# Patient Record
Sex: MALE | Race: WHITE | NOT HISPANIC OR LATINO | URBAN - METROPOLITAN AREA
[De-identification: names, ages, dates, MRNs, and addresses within clinical notes are randomized per-mention and may not be internally consistent; named-entity substitution may affect disease eponyms.]

---

## 2022-03-03 ENCOUNTER — WEB ENCOUNTER (OUTPATIENT)
Dept: URBAN - METROPOLITAN AREA CLINIC 94 | Facility: CLINIC | Age: 42
End: 2022-03-03

## 2022-03-03 ENCOUNTER — OFFICE VISIT (OUTPATIENT)
Dept: URBAN - METROPOLITAN AREA CLINIC 94 | Facility: CLINIC | Age: 42
End: 2022-03-03
Payer: COMMERCIAL

## 2022-03-03 VITALS
TEMPERATURE: 97.2 F | HEART RATE: 94 BPM | HEIGHT: 65 IN | DIASTOLIC BLOOD PRESSURE: 101 MMHG | WEIGHT: 141 LBS | SYSTOLIC BLOOD PRESSURE: 149 MMHG | BODY MASS INDEX: 23.49 KG/M2

## 2022-03-03 DIAGNOSIS — R13.12 OROPHARYNGEAL DYSPHAGIA: ICD-10-CM

## 2022-03-03 DIAGNOSIS — K76.0 FATTY LIVER: ICD-10-CM

## 2022-03-03 DIAGNOSIS — R10.12 LUQ ABDOMINAL PAIN: ICD-10-CM

## 2022-03-03 DIAGNOSIS — R79.89 LFT ELEVATION: ICD-10-CM

## 2022-03-03 PROBLEM — 197321007: Status: ACTIVE | Noted: 2022-03-03

## 2022-03-03 PROCEDURE — 99204 OFFICE O/P NEW MOD 45 MIN: CPT | Performed by: PHYSICIAN ASSISTANT

## 2022-03-03 RX ORDER — ALBUTEROL SULFATE 90 UG/1
1 PUFF AS NEEDED AEROSOL, METERED RESPIRATORY (INHALATION)
Status: ON HOLD | COMMUNITY

## 2022-03-03 NOTE — HPI-TODAY'S VISIT:
40 yo M evaluated today for hx of dysphagia.   Pt reports a 3 weeks hx of dysphagia. Pt reports eating soft food, bread or rice without problems. He reports that with eating meat, his throat will close and he cannot eat anything else. This happens with meat or red sauce. He denies a hx of asthma per pt but he uses Albuterol prn. Pt has had similar sx for at least 1 yr. Pt reports sx started after having CCY in 2018. He denies acid reflux, heartburn or N/V. Pt denies ever having an EGD.   He reports extreme diarrhea over the past 3 weeks. He has 3-4 watery, non bloody stools/day which he feels is related to above issues.   Pt had multiple syncopal episodes x 5 in one day and then one more the following day. He was evaluated at [facility] and labs revealed ALT 71 otherwise no abnormalities with CMP, CBC, Thyroid panel. Pt denies ETOH use or changes in medication.

## 2022-03-05 ENCOUNTER — WEB ENCOUNTER (OUTPATIENT)
Dept: URBAN - METROPOLITAN AREA CLINIC 94 | Facility: CLINIC | Age: 42
End: 2022-03-05

## 2022-03-11 LAB
AAT, DNA ANALYSIS: (no result)
ACTIN (SMOOTH MUSCLE) ANTIBODY: 7
ADDITIONAL INFORMATION:: (no result)
ALBUMIN: 5.4
ALKALINE PHOSPHATASE: 119
ALPHA 2-MACROGLOBULINS, QN: 139
ALT (SGPT) P5P: 111
ALT (SGPT): 102
ANA DIRECT: NEGATIVE
APOLIPOPROTEIN A-1: 109
AST (SGOT) P5P: 48
AST (SGOT): 38
BILIRUBIN, DIRECT: <0.1
BILIRUBIN, TOTAL: 0.2
BILIRUBIN, TOTAL: 0.3
CHOLESTEROL, TOTAL: 192
COMMENT:: (no result)
FIBROSIS SCORE: 0.07
FIBROSIS SCORING:: (no result)
FIBROSIS STAGE: (no result)
GGT: 56
GLUCOSE, SERUM: 75
HAPTOGLOBIN: 255
HBSAG SCREEN: NEGATIVE
HCV AB: <0.1
HEIGHT:: 65
HEP A AB, IGM: NEGATIVE
HEP B CORE AB, IGM: NEGATIVE
INTERPRETATION:: (no result)
INTERPRETATIONS:: (no result)
IRON BIND.CAP.(TIBC): 320
IRON SATURATION: 34
IRON: 108
LIMITATIONS:: (no result)
Lab: (no result)
MITOCHONDRIAL (M2) ANTIBODY: <20
NASH GRADE: (no result)
NASH SCORE: 0.5
NASH SCORING: (no result)
PROTEIN, TOTAL: 8
STEATOSIS GRADE: (no result)
STEATOSIS GRADING: (no result)
STEATOSIS SCORE: 0.73
TRIGLYCERIDES: 322
UIBC: 212
WEIGHT:: 141

## 2022-03-13 ENCOUNTER — TELEPHONE ENCOUNTER (OUTPATIENT)
Dept: URBAN - METROPOLITAN AREA CLINIC 94 | Facility: CLINIC | Age: 42
End: 2022-03-13

## 2022-03-15 ENCOUNTER — WEB ENCOUNTER (OUTPATIENT)
Dept: URBAN - METROPOLITAN AREA CLINIC 94 | Facility: CLINIC | Age: 42
End: 2022-03-15

## 2022-04-07 ENCOUNTER — OFFICE VISIT (OUTPATIENT)
Dept: URBAN - METROPOLITAN AREA SURGERY CENTER 17 | Facility: SURGERY CENTER | Age: 42
End: 2022-04-07
Payer: COMMERCIAL

## 2022-04-07 ENCOUNTER — CLAIMS CREATED FROM THE CLAIM WINDOW (OUTPATIENT)
Dept: URBAN - METROPOLITAN AREA CLINIC 4 | Facility: CLINIC | Age: 42
End: 2022-04-07
Payer: COMMERCIAL

## 2022-04-07 DIAGNOSIS — K21.9 GASTRO-ESOPHAGEAL REFLUX DISEASE WITHOUT ESOPHAGITIS: ICD-10-CM

## 2022-04-07 DIAGNOSIS — K31.89 FOCAL FOVEOLAR HYPERPLASIA: ICD-10-CM

## 2022-04-07 DIAGNOSIS — K31.89 ACQUIRED DEFORMITY OF DUODENUM: ICD-10-CM

## 2022-04-07 DIAGNOSIS — K21.9 ACID REFLUX: ICD-10-CM

## 2022-04-07 DIAGNOSIS — R13.19 CERVICAL DYSPHAGIA: ICD-10-CM

## 2022-04-07 DIAGNOSIS — R10.12 ABDOMINAL BURNING SENSATION IN LEFT UPPER QUADRANT: ICD-10-CM

## 2022-04-07 PROCEDURE — 88312 SPECIAL STAINS GROUP 1: CPT | Performed by: PATHOLOGY

## 2022-04-07 PROCEDURE — 88305 TISSUE EXAM BY PATHOLOGIST: CPT | Performed by: PATHOLOGY

## 2022-04-07 PROCEDURE — G8907 PT DOC NO EVENTS ON DISCHARG: HCPCS | Performed by: INTERNAL MEDICINE

## 2022-04-07 PROCEDURE — 43239 EGD BIOPSY SINGLE/MULTIPLE: CPT | Performed by: INTERNAL MEDICINE

## 2022-04-07 RX ORDER — ALBUTEROL SULFATE 90 UG/1
1 PUFF AS NEEDED AEROSOL, METERED RESPIRATORY (INHALATION)
Status: ON HOLD | COMMUNITY

## 2022-04-14 ENCOUNTER — DASHBOARD ENCOUNTERS (OUTPATIENT)
Age: 42
End: 2022-04-14

## 2022-04-14 ENCOUNTER — OFFICE VISIT (OUTPATIENT)
Dept: URBAN - METROPOLITAN AREA CLINIC 94 | Facility: CLINIC | Age: 42
End: 2022-04-14
Payer: COMMERCIAL

## 2022-04-14 VITALS
SYSTOLIC BLOOD PRESSURE: 129 MMHG | HEART RATE: 86 BPM | WEIGHT: 140 LBS | DIASTOLIC BLOOD PRESSURE: 87 MMHG | HEIGHT: 65 IN | OXYGEN SATURATION: 94 % | BODY MASS INDEX: 23.32 KG/M2 | TEMPERATURE: 97.2 F

## 2022-04-14 DIAGNOSIS — K29.70 GASTRITIS WITHOUT BLEEDING, UNSPECIFIED CHRONICITY, UNSPECIFIED GASTRITIS TYPE: ICD-10-CM

## 2022-04-14 DIAGNOSIS — R13.12 OROPHARYNGEAL DYSPHAGIA: ICD-10-CM

## 2022-04-14 DIAGNOSIS — K75.81 NASH (NONALCOHOLIC STEATOHEPATITIS): ICD-10-CM

## 2022-04-14 PROBLEM — 71457002: Status: ACTIVE | Noted: 2022-03-03

## 2022-04-14 PROBLEM — 442685003: Status: ACTIVE | Noted: 2022-03-13

## 2022-04-14 PROBLEM — 4556007: Status: ACTIVE | Noted: 2022-04-14

## 2022-04-14 PROCEDURE — 99213 OFFICE O/P EST LOW 20 MIN: CPT | Performed by: PHYSICIAN ASSISTANT

## 2022-04-14 RX ORDER — ALBUTEROL SULFATE 90 UG/1
1 PUFF AS NEEDED AEROSOL, METERED RESPIRATORY (INHALATION)
Status: ACTIVE | COMMUNITY

## 2022-04-14 NOTE — HPI-TODAY'S VISIT:
40 yo M evaluated today for post procedure f/u visit for dysphagia and fatty liver. Pt accompanied by his wife.   EGD 4/7/2022 reveals normal esophagus, gastric and duodenal erythema. Pathology has not returned yet.   Pt with elevated LFTs. CT - revealed fatty liver. Additional labs ordered. See below.  Fibroscan revealed severe fatty liver without fibrosis or cirrhosis. Pt had additional labs revealing elevated LDL and triglycerides and they have appt with cardiologist next week. Pt also has appt to see Endo and would like to see hepatologist. Pt denies ETOH use.   Pt also with hx of dysphagia. Pt reports eating soft food, bread or rice without problems. He reports that with eating meat, his throat will close and he cannot eat anything else. This happens with meat or red sauce. He denies a hx of asthma per pt but he uses Albuterol prn. Pt has had similar sx for at least 1 yr. Pt reports sx started after having CCY in 2018. He denies acid reflux, heartburn or N/V.   He reports extreme diarrhea over the past 3 weeks. He has 3-4 watery, non bloody stools/day which he feels is related to above issues.

## 2022-04-26 ENCOUNTER — WEB ENCOUNTER (OUTPATIENT)
Dept: URBAN - METROPOLITAN AREA CLINIC 94 | Facility: CLINIC | Age: 42
End: 2022-04-26